# Patient Record
(demographics unavailable — no encounter records)

---

## 2017-11-19 NOTE — CT
CT BRAIN WITHOUT CONTRAST:

 

Date:  11/19/17 

 

HISTORY:  

Elevated blood pressure. Headache. 

 

COMPARISON:  

CT brain dated 02/11/17. 

 

FINDINGS:

Microvascular ischemic changes are similar. No acute territorial infarct or hemorrhage. No midline sh
ift or mass effect. Mild atrophy. Paranasal sinuses and mastoids are clear. 

 

IMPRESSION: 

No acute intracranial abnormality.  

 

POS: [facility]

## 2017-12-30 NOTE — RAD
PA AND LATERAL OF THE CHEST:

 

INDICATION:

Cough.

 

IMPRESSION:  

No acute cardiopulmonary abnormality.

 

COMMENTS:

The examination appears somewhat similar to comparison of 3/31/2015.  Surgical clips within the left 
axillary region are stable.  Tortuosity of the aorta is similar.  Thoracolumbar scoliosis is similar.
  No acute osseous abnormality is evident.

 

POS: [facility]